# Patient Record
Sex: MALE | Race: OTHER | HISPANIC OR LATINO | Employment: FULL TIME | URBAN - METROPOLITAN AREA
[De-identification: names, ages, dates, MRNs, and addresses within clinical notes are randomized per-mention and may not be internally consistent; named-entity substitution may affect disease eponyms.]

---

## 2018-07-04 ENCOUNTER — HOSPITAL ENCOUNTER (EMERGENCY)
Facility: HOSPITAL | Age: 33
Discharge: HOME | End: 2018-07-04
Attending: EMERGENCY MEDICINE | Admitting: EMERGENCY MEDICINE

## 2018-07-04 ENCOUNTER — APPOINTMENT (EMERGENCY)
Dept: RADIOLOGY | Facility: HOSPITAL | Age: 33
End: 2018-07-04

## 2018-07-04 VITALS
DIASTOLIC BLOOD PRESSURE: 70 MMHG | HEIGHT: 62 IN | TEMPERATURE: 98.3 F | BODY MASS INDEX: 30.36 KG/M2 | WEIGHT: 165 LBS | HEART RATE: 90 BPM | OXYGEN SATURATION: 99 % | SYSTOLIC BLOOD PRESSURE: 128 MMHG | RESPIRATION RATE: 20 BRPM

## 2018-07-04 DIAGNOSIS — S81.819A LACERATION OF LEG: Primary | ICD-10-CM

## 2018-07-04 PROCEDURE — 73610 X-RAY EXAM OF ANKLE: CPT

## 2018-07-04 PROCEDURE — 99283 EMERGENCY DEPT VISIT LOW MDM: CPT

## 2018-07-04 RX ORDER — LIDOCAINE HYDROCHLORIDE AND EPINEPHRINE 10; 10 MG/ML; UG/ML
1 INJECTION, SOLUTION INFILTRATION; PERINEURAL ONCE
Status: COMPLETED | OUTPATIENT
Start: 2018-07-04 | End: 2018-07-04

## 2018-07-04 RX ORDER — HYDROCODONE BITARTRATE AND ACETAMINOPHEN 5; 325 MG/1; MG/1
1 TABLET ORAL ONCE
Status: COMPLETED | OUTPATIENT
Start: 2018-07-04 | End: 2018-07-04

## 2018-07-04 RX ORDER — HYDROCODONE BITARTRATE AND ACETAMINOPHEN 5; 325 MG/1; MG/1
1 TABLET ORAL EVERY 6 HOURS PRN
Qty: 20 TABLET | Refills: 0 | Status: SHIPPED | OUTPATIENT
Start: 2018-07-04 | End: 2018-07-11

## 2018-07-04 RX ORDER — GINSENG 100 MG
1 CAPSULE ORAL ONCE
Status: COMPLETED | OUTPATIENT
Start: 2018-07-04 | End: 2018-07-04

## 2018-07-04 RX ADMIN — Medication 1 APPLICATION: at 12:58

## 2018-07-04 RX ADMIN — HYDROCODONE BITARTRATE AND ACETAMINOPHEN 1 TABLET: 5; 325 TABLET ORAL at 14:06

## 2018-07-04 RX ADMIN — LIDOCAINE HYDROCHLORIDE,EPINEPHRINE BITARTRATE 1 ML: 10; .01 INJECTION, SOLUTION INFILTRATION; PERINEURAL at 12:59

## 2018-07-04 RX ADMIN — BACITRACIN ZINC 1 SMALL APPLICATION: 500 OINTMENT TOPICAL at 12:59

## 2018-07-04 NOTE — ED PROVIDER NOTES
History  Chief Complaint   Patient presents with    Extremity Laceration     States was digging fence post holes with a machine and a buried wire from old fence caught on anca and wire struck and cut left lower leg   Lacerations left lower leg medial aspect  Patient was using a post hole digging auger today, which ensnared a buried wire  The wire wrapped around his construction boot, and lacerated the left lower leg just above the boot  Patient was able to remove the wire prior to arrival   He has a v-shaped laceration of the tib-fib area, 1 leg a which is quite open, the other is partial-thickness  He has pain from the area of the wound down to the foot with erythema and swelling  Patient states his tetanus is current from last year, denies other injury            None       History reviewed  No pertinent past medical history  History reviewed  No pertinent surgical history  History reviewed  No pertinent family history  I have reviewed and agree with the history as documented  Social History   Substance Use Topics    Smoking status: Never Smoker    Smokeless tobacco: Never Used    Alcohol use Yes      Comment: social         Review of Systems   Constitutional: Negative for chills and fever  HENT: Negative for congestion  Respiratory: Negative for cough and shortness of breath  Cardiovascular: Negative for chest pain  Gastrointestinal: Negative for abdominal pain  Genitourinary: Negative for dysuria  Musculoskeletal: Positive for arthralgias, gait problem and joint swelling  Skin: Positive for wound  Neurological: Negative for syncope and headaches  Hematological: Does not bruise/bleed easily  All other systems reviewed and are negative  Physical Exam  Physical Exam   Constitutional: He appears well-developed and well-nourished  HENT:   Head: Normocephalic and atraumatic     Mouth/Throat: Oropharynx is clear and moist    Eyes: Conjunctivae are normal    Neck: Normal range of motion  Cardiovascular: Normal rate, regular rhythm, normal heart sounds and intact distal pulses  Pulmonary/Chest: Effort normal and breath sounds normal    Abdominal: Soft  There is no tenderness  Musculoskeletal: Normal range of motion  He exhibits edema and tenderness  Neurological: He is alert  Skin: Skin is warm and dry  Patient is a 6 cm laceration which is partial-thickness only  There is also a 4 cm laceration which is full thickness  Psychiatric: He has a normal mood and affect  His behavior is normal    Nursing note and vitals reviewed  Vital Signs  ED Triage Vitals [07/04/18 1244]   Temperature Pulse Respirations Blood Pressure SpO2   98 3 °F (36 8 °C) 90 20 128/70 99 %      Temp Source Heart Rate Source Patient Position - Orthostatic VS BP Location FiO2 (%)   Oral Monitor Lying Left arm --      Pain Score       7           Vitals:    07/04/18 1244   BP: 128/70   Pulse: 90   Patient Position - Orthostatic VS: Lying       Visual Acuity      ED Medications  Medications   HYDROcodone-acetaminophen (NORCO) 5-325 mg per tablet 1 tablet (not administered)   LET gel 1 application (1 application Topical Given 7/4/18 1258)   lidocaine-epinephrine (XYLOCAINE/EPINEPHRINE) 1 %-1:100,000 injection 1 mL (1 mL Infiltration Given 7/4/18 1259)   bacitracin topical ointment 1 small application (1 small application Topical Given 7/4/18 1259)       Diagnostic Studies  Results Reviewed     None                 XR ankle 3+ views LEFT   Final Result by Itzel García MD (07/04 1327)      No acute osseous abnormality  Workstation performed: [id number]                    Procedures  Lac Repair  Date/Time: 7/4/2018 1:29 PM  Performed by: Lucio Reyes  Authorized by: Lucio Reyes   Consent: Verbal consent obtained    Risks and benefits: risks, benefits and alternatives were discussed  Consent given by: patient  Patient identity confirmed: verbally with patient  Body area: lower extremity  Location details: left lower leg  Laceration length: 4 cm  Contamination: The wound is contaminated  Foreign body present: DIRT  Tendon involvement: none  Nerve involvement: none  Vascular damage: no  Anesthesia: local infiltration    Anesthesia:  Local Anesthetic: lidocaine 1% with epinephrine    Wound Dehiscence:  Superficial Wound Dehiscence: simple closure      Procedure Details:  Irrigation solution: saline  Irrigation method: syringe  Amount of cleaning: standard  Skin closure: 4-0 nylon  Number of sutures: 4  Technique: horizontal mattress  Approximation: close  Approximation difficulty: simple  Dressing: antibiotic ointment  Patient tolerance: Patient tolerated the procedure well with no immediate complications             Phone Contacts  ED Phone Contact    ED Course                               MDM  CritCare Time    Disposition  Final diagnoses:   Laceration of leg     Time reflects when diagnosis was documented in both MDM as applicable and the Disposition within this note     Time User Action Codes Description Comment    7/4/2018  1:57 PM Mark Robb [[id number]] Laceration of leg       ED Disposition     ED Disposition Condition Comment    Discharge  Rigo Weeks discharge to home/self care      Condition at discharge: Stable        Follow-up Information     Follow up With Specialties Details Why Contact Info Additional Information    [facility] Emergency Department Emergency Medicine  As needed [address] [facility] [address] [facility], [place], [place], [address]          Patient's Medications   Discharge Prescriptions    HYDROCODONE-ACETAMINOPHEN (NORCO) 5-325 MG PER TABLET    Take 1 tablet by mouth every 6 (six) hours as needed for pain for up to 7 days Max Daily Amount: 4 tablets       Start Date: 7/4/2018  End Date: 7/11/2018       Order Dose: 1 tablet       Quantity: 20 tablet    Refills: 0     No discharge procedures on file      ED Provider  Electronically Signed by           Dwight Magdaleno MD  07/04/18 1400

## 2018-07-04 NOTE — DISCHARGE INSTRUCTIONS
Laceración   LO QUE NECESITA SABER:   Sujey laceración es sujey herida que se presenta en la piel y en el tejido blando que hay debajo de lisa  Las laceraciones ocurren cuando usted recibe un jenny o un golpe con algún objeto  Estas pueden presentarse en cualquier parte del cuerpo  INSTRUCCIONES SOBRE EL JUSTO HOSPITALARIA:   Regrese a la eddie de emergencias si:   · Está sangrando mucho o tiene sangrado que no para después de 10 minutos de aplicar presión firme y directa sobre la herida  · Stephens herida se abre  Pregúntele a stephens [place] vitaminas y minerales son adecuados para usted  · Usted tiene fiebre o escalofríos  · Stephens laceración está enrojecida, tibia o inflamada  · En la piel de stephens herida le salen unas leslie avila  · Usted tiene drenaje alicea o amarillo saliendo de la herida que tiene mal [place]  · Usted tiene dolor que está empeorando después del [place]  · Usted tiene preguntas o inquietudes acerca de stephens condición o cuidado  Medicamentos:   · Un medicamento con receta para el dolor  podrían ser Laron Kenyon  Pregunte cómo ivan estos medicamentos de sujey forma hussein  · Antibióticos  ayudan a tratar o prevenir infecciones bacteriales  · [place] navin medicamentos francheska se le haya indicado  Consulte con stephens médico si usted kong que stephens medicamento no le está ayudando o si presenta efectos secundarios  Infórmele si es alérgico a cualquier medicamento  Mantenga sujey lista actualizada de los [place], las vitaminas y los productos herbales que natalia  Incluya los siguientes datos de los medicamentos: cantidad, frecuencia y motivo de administración  Traiga con usted la lista o los envases de la píldoras a navin citas de seguimiento  Lleve la lista de los medicamentos con usted en kwasi de sujey emergencia  Siga las instrucciones de stephens médico sobre el cuidado de navin heridas:   · No moje la herida  hasta que stephens médico lo autorice  No sumerja stephens herida en agua   No vaya a nadar hasta que stephens médico lo autorice  Lave cuidadosamente la herida con agua y Med Necessary  Seque el área con palmadas suaves o permita que se seque al aire  · [place] navin vendas  cuando se mojen, estén sucios o después del lavado  Aplique un vendaje limpio según las indicaciones  No se aplique un vendaje elástico ni cinta muy apretada  No se aplique polvos ni sujey loción en nboles incisión  · Aplique un ungüento antibiótico francheska se indica  Nobles médico le puede formular un ungüento antibiótico para que se aplique sobre nobles herida en kwasi que tenga puntos de sutura  En kwasi de tener cintas o tiras sobre nobles incisión, permita que se sequen y se caigan solas  En kwasi que no se caigan en 14 días, retírelas con cuidado  Si usted tiene [employer] herida, no lo retire ni se lo moleste  Si el pegamento se , no lo reemplace con pegamento casero  · Revise nobles herida todos los días para detectar signos de infección tales francheska hinchazón, enrojecimiento o pus  Cuidados personales:   · Aplique hielo  en la herida de 15 a 20 minutos cada hora o francheska se le indique  Use un paquete de hielo o ponga hielo molido dentro de [employer]  Cúbrala con sujey toalla  El hielo ayuda a evitar daño al tejido y a disminuir la inflamación y el dolor  · Use sujey férula según las indicaciones  Sujey férula lo inmovilizará y disminuirá la tensión sobre la herida  Es posible que le sirva para recuperarse más rápido  Riley Jaime se puede usar para sujey laceración [employer] articulaciones o las áreas de nobles cuerpo que se doblan  Pregunte a nobles médico cómo se debe colocar y retirar nobles férula  · Brianafurt cicatrización de nobles herida  aplicando un ungüento o pomada según las indicaciones  No se aplique pomadas en la herida hasta que nobles médico se lo indique  Es posible que necesite esperar hasta que la herida sane  Pregunte cuál pomada debe comprar y la frecuencia con que debe usarla   Después de que la herida sane, use un bloqueador de sol sobre Samantha Lout cuando se encuentre expuesta al sol  Usted debe hacer esto nya al menos 6 meses hasta 1 año después de heidi sufrido Lisa Cancer  Acuda a navin consultas de control con stephens médico según le indicaron  Es posible que usted necesite programar sujey maurice de seguimiento dentro de 24 a 50 horas para que controlen que la herida no se haya infectado  Usted tendrá que regresar dentro de 3 a 15 días para que le retiren los puntos de sutura o grapas  Cuide stephens herida según las indicaciones para prevenir sujey infección y ayudarla a sanar  Anote navin preguntas para que se acuerde de hacerlas nay navin visitas  © 2017 [address] Information is for End User's use only and may not be sold, redistributed or otherwise used for commercial purposes  All illustrations and images included in CareNotes® are the copyrighted property of A D A M , Inc  or Kurtis Brown  Esta información es sólo para uso en educación  Stephens intención no es darle un consejo médico sobre enfermedades o tratamientos  Colsulte con stephens Krystina Kind farmacéutico antes de seguir cualquier régimen médico para saber si es seguro y efectivo para usted        SUTURE REMOVAL IN 10 TO 12 DAYS

## 2020-01-05 ENCOUNTER — HOSPITAL ENCOUNTER (EMERGENCY)
Facility: HOSPITAL | Age: 35
Discharge: HOME | End: 2020-01-05
Attending: EMERGENCY MEDICINE | Admitting: EMERGENCY MEDICINE

## 2020-01-05 VITALS
TEMPERATURE: 97.4 F | HEART RATE: 70 BPM | OXYGEN SATURATION: 98 % | DIASTOLIC BLOOD PRESSURE: 80 MMHG | SYSTOLIC BLOOD PRESSURE: 127 MMHG | HEIGHT: 62 IN | BODY MASS INDEX: 29.44 KG/M2 | RESPIRATION RATE: 18 BRPM | WEIGHT: 160 LBS

## 2020-01-05 DIAGNOSIS — S01.111A EYEBROW LACERATION, RIGHT, INITIAL ENCOUNTER: Primary | ICD-10-CM

## 2020-01-05 PROCEDURE — 90715 TDAP VACCINE 7 YRS/> IM: CPT | Performed by: EMERGENCY MEDICINE

## 2020-01-05 PROCEDURE — 99282 EMERGENCY DEPT VISIT SF MDM: CPT

## 2020-01-05 PROCEDURE — 12013 RPR F/E/E/N/L/M 2.6-5.0 CM: CPT | Performed by: EMERGENCY MEDICINE

## 2020-01-05 PROCEDURE — 90471 IMMUNIZATION ADMIN: CPT

## 2020-01-05 PROCEDURE — 99284 EMERGENCY DEPT VISIT MOD MDM: CPT | Performed by: EMERGENCY MEDICINE

## 2020-01-05 RX ORDER — BACITRACIN, NEOMYCIN, POLYMYXIN B 400; 3.5; 5 [USP'U]/G; MG/G; [USP'U]/G
1 OINTMENT TOPICAL ONCE
Status: COMPLETED | OUTPATIENT
Start: 2020-01-05 | End: 2020-01-05

## 2020-01-05 RX ORDER — LIDOCAINE HYDROCHLORIDE AND EPINEPHRINE 10; 10 MG/ML; UG/ML
1 INJECTION, SOLUTION INFILTRATION; PERINEURAL ONCE
Status: COMPLETED | OUTPATIENT
Start: 2020-01-05 | End: 2020-01-05

## 2020-01-05 RX ADMIN — LIDOCAINE HYDROCHLORIDE,EPINEPHRINE BITARTRATE 1 ML: 10; .01 INJECTION, SOLUTION INFILTRATION; PERINEURAL at 18:29

## 2020-01-05 RX ADMIN — TETANUS TOXOID, REDUCED DIPHTHERIA TOXOID AND ACELLULAR PERTUSSIS VACCINE, ADSORBED 0.5 ML: 5; 2.5; 8; 8; 2.5 SUSPENSION INTRAMUSCULAR at 18:30

## 2020-01-05 RX ADMIN — BACITRACIN, NEOMYCIN, POLYMYXIN B 1 SMALL APPLICATION: 400; 3.5; 5 OINTMENT TOPICAL at 18:29

## 2020-01-05 NOTE — ED PROVIDER NOTES
History  Chief Complaint   Patient presents with    Facial Laceration     deeo laceration to right eyebrow  fell and hit face on machine at work 30 minutes ago     Patient slipped into a machine at work and struck his face  He has a oblique laceration going across the right eyebrow which is extensive and deep  The eyeball is not affected there is no visual change  Patient did not lose consciousness  States he has no other injury does not have neck pain  He presents awake and alert with a large laceration to the eyebrow and face          None       History reviewed  No pertinent past medical history  History reviewed  No pertinent surgical history  History reviewed  No pertinent family history  I have reviewed and agree with the history as documented  Social History     Tobacco Use    Smoking status: Never Smoker    Smokeless tobacco: Never Used   Substance Use Topics    Alcohol use: Yes     Comment: social     Drug use: No        Review of Systems   Constitutional: Negative for fever  HENT: Negative for sore throat  Eyes: Negative for photophobia, pain and visual disturbance  Respiratory: Negative for shortness of breath  Cardiovascular: Negative for chest pain  Gastrointestinal: Negative for abdominal pain and vomiting  Musculoskeletal: Negative for back pain and neck pain  Skin: Positive for wound  Neurological: Negative for syncope and weakness  Hematological: Does not bruise/bleed easily  Psychiatric/Behavioral: Negative for confusion  All other systems reviewed and are negative  Physical Exam  Physical Exam   Constitutional: He is oriented to person, place, and time  He appears well-developed and well-nourished  HENT:   Right Ear: External ear normal    Left Ear: External ear normal    Mouth/Throat: Oropharynx is clear and moist    Large laceration about 5 cm long across the right eyebrow and face    No active   Eyes: Pupils are equal, round, and reactive to light  Conjunctivae and EOM are normal    Neck: Normal range of motion  Neck supple  Cardiovascular: Normal rate, regular rhythm and normal heart sounds  Pulmonary/Chest: Effort normal    Abdominal: Soft  There is no tenderness  Musculoskeletal: Normal range of motion  He exhibits no tenderness  Neurological: He is alert and oriented to person, place, and time  No cranial nerve deficit  Coordination normal    Skin: Skin is warm and dry  Capillary refill takes less than 2 seconds  Psychiatric: He has a normal mood and affect  His behavior is normal        Vital Signs  ED Triage Vitals   Temperature Pulse Respirations Blood Pressure SpO2   01/05/20 1815 01/05/20 1815 01/05/20 1815 01/05/20 1815 01/05/20 1815   (!) 97 4 °F (36 3 °C) 70 18 127/80 98 %      Temp Source Heart Rate Source Patient Position - Orthostatic VS BP Location FiO2 (%)   01/05/20 1815 01/05/20 1815 01/05/20 1815 01/05/20 1815 --   Tympanic Monitor Sitting Right arm       Pain Score       01/05/20 1817       2           Vitals:    01/05/20 1815   BP: 127/80   Pulse: 70   Patient Position - Orthostatic VS: Sitting         Visual Acuity      ED Medications  Medications   lidocaine-epinephrine (XYLOCAINE/EPINEPHRINE) 1 %-1:100,000 injection 1 mL (1 mL Infiltration Given 1/5/20 1829)   neomycin-bacitracin-polymyxin b (NEOSPORIN) ointment 1 small application (1 small application Topical Given 1/5/20 1829)   tetanus-diphtheria-acellular pertussis (BOOSTRIX) IM injection 0 5 mL (0 5 mL Intramuscular Given 1/5/20 1830)       Diagnostic Studies  Results Reviewed     None                 No orders to display              Procedures  Laceration repair  Date/Time: 1/5/2020 6:33 PM  Performed by: Otoniel Morris MD  Authorized by: Otoniel Morris MD   Consent: Verbal consent obtained    Risks and benefits: risks, benefits and alternatives were discussed  Consent given by: patient  Body area: head/neck  Location details: right eyebrow  Laceration length: 5 cm  Foreign bodies: no foreign bodies  Vascular damage: no  Anesthesia: local infiltration    Anesthesia:  Local Anesthetic: lidocaine 1% with epinephrine    Sedation:  Patient sedated: no        Procedure Details:  Irrigation solution: saline  Irrigation method: syringe  Amount of cleaning: standard  Skin closure: 6-0 nylon  Subcutaneous closure: 5-0 Vicryl  Number of sutures: 17  Technique: running  Approximation: close  Approximation difficulty: simple  Dressing: antibiotic ointment  Patient tolerance: Patient tolerated the procedure well with no immediate complications               ED Course                               MDM  Number of Diagnoses or Management Options  Eyebrow laceration, right, initial encounter:   Diagnosis management comments: Wound repaired and wound instructions given  Follow-up suture removal        Disposition  Final diagnoses:   Eyebrow laceration, right, initial encounter     Time reflects when diagnosis was documented in both MDM as applicable and the Disposition within this note     Time User Action Codes Description Comment    1/5/2020  7:04 PM Diya Person Add [[id number]] Eyebrow laceration, right, initial encounter       ED Disposition     ED Disposition Condition Date/Time Comment    Discharge Stable Sun Jan 5, 2020  7:04 PM Teri Guevara discharge to home/self care  Follow-up Information     Follow up With Specialties Details Why Contact Info    [employer]  Schedule an appointment as soon as possible for a visit   [phone]            There are no discharge medications for this patient  No discharge procedures on file      ED Provider  Electronically Signed by           David Goss MD  01/09/20 3140